# Patient Record
Sex: MALE | Race: WHITE | Employment: OTHER | URBAN - METROPOLITAN AREA
[De-identification: names, ages, dates, MRNs, and addresses within clinical notes are randomized per-mention and may not be internally consistent; named-entity substitution may affect disease eponyms.]

---

## 2018-11-13 ENCOUNTER — HOSPITAL ENCOUNTER (EMERGENCY)
Age: 83
Discharge: HOME | End: 2018-11-13
Attending: EMERGENCY MEDICINE
Payer: MEDICARE

## 2018-11-13 ENCOUNTER — APPOINTMENT (OUTPATIENT)
Dept: CT IMAGING | Age: 83
End: 2018-11-13
Attending: EMERGENCY MEDICINE
Payer: MEDICARE

## 2018-11-13 ENCOUNTER — APPOINTMENT (OUTPATIENT)
Dept: GENERAL RADIOLOGY | Age: 83
End: 2018-11-13
Attending: EMERGENCY MEDICINE
Payer: MEDICARE

## 2018-11-13 VITALS
OXYGEN SATURATION: 100 % | HEART RATE: 55 BPM | RESPIRATION RATE: 16 BRPM | DIASTOLIC BLOOD PRESSURE: 75 MMHG | HEIGHT: 66 IN | TEMPERATURE: 97.7 F | WEIGHT: 180 LBS | SYSTOLIC BLOOD PRESSURE: 148 MMHG | BODY MASS INDEX: 28.93 KG/M2

## 2018-11-13 DIAGNOSIS — R40.4 TRANSIENT ALTERATION OF AWARENESS: Primary | ICD-10-CM

## 2018-11-13 LAB
ALBUMIN SERPL-MCNC: 3.4 G/DL (ref 3.4–5)
ALBUMIN/GLOB SERPL: 0.9 {RATIO} (ref 0.8–1.7)
ALP SERPL-CCNC: 60 U/L (ref 45–117)
ALT SERPL-CCNC: 20 U/L (ref 16–61)
ANION GAP SERPL CALC-SCNC: 9 MMOL/L (ref 3–18)
APPEARANCE UR: CLEAR
AST SERPL-CCNC: 16 U/L (ref 15–37)
BASOPHILS # BLD: 0 K/UL (ref 0–0.1)
BASOPHILS NFR BLD: 0 % (ref 0–2)
BILIRUB SERPL-MCNC: 0.6 MG/DL (ref 0.2–1)
BILIRUB UR QL: NEGATIVE
BUN SERPL-MCNC: 21 MG/DL (ref 7–18)
BUN/CREAT SERPL: 21
CALCIUM SERPL-MCNC: 8.6 MG/DL (ref 8.5–10.1)
CHLORIDE SERPL-SCNC: 106 MMOL/L (ref 100–108)
CK MB CFR SERPL CALC: 2.4 % (ref 0–4)
CK MB SERPL-MCNC: 1.7 NG/ML (ref 5–25)
CK SERPL-CCNC: 70 U/L (ref 39–308)
CO2 SERPL-SCNC: 24 MMOL/L (ref 21–32)
COLOR UR: YELLOW
CREAT SERPL-MCNC: 1 MG/DL (ref 0.6–1.3)
DIFFERENTIAL METHOD BLD: ABNORMAL
EOSINOPHIL # BLD: 0.2 K/UL (ref 0–0.4)
EOSINOPHIL NFR BLD: 2 % (ref 0–5)
ERYTHROCYTE [DISTWIDTH] IN BLOOD BY AUTOMATED COUNT: 13.7 % (ref 11.6–14.5)
GLOBULIN SER CALC-MCNC: 3.7 G/DL (ref 2–4)
GLUCOSE SERPL-MCNC: 141 MG/DL (ref 74–99)
GLUCOSE UR STRIP.AUTO-MCNC: NEGATIVE MG/DL
HCT VFR BLD AUTO: 40.2 % (ref 36–48)
HGB BLD-MCNC: 13.5 G/DL (ref 13–16)
HGB UR QL STRIP: NEGATIVE
KETONES UR QL STRIP.AUTO: NEGATIVE MG/DL
LEUKOCYTE ESTERASE UR QL STRIP.AUTO: NEGATIVE
LYMPHOCYTES # BLD: 1.1 K/UL (ref 0.9–3.6)
LYMPHOCYTES NFR BLD: 10 % (ref 21–52)
MCH RBC QN AUTO: 30.3 PG (ref 24–34)
MCHC RBC AUTO-ENTMCNC: 33.6 G/DL (ref 31–37)
MCV RBC AUTO: 90.3 FL (ref 74–97)
MONOCYTES # BLD: 0.9 K/UL (ref 0.05–1.2)
MONOCYTES NFR BLD: 9 % (ref 3–10)
NEUTS SEG # BLD: 8.3 K/UL (ref 1.8–8)
NEUTS SEG NFR BLD: 79 % (ref 40–73)
NITRITE UR QL STRIP.AUTO: NEGATIVE
PH UR STRIP: 6 [PH] (ref 5–8)
PLATELET # BLD AUTO: 190 K/UL (ref 135–420)
PMV BLD AUTO: 10.4 FL (ref 9.2–11.8)
POTASSIUM SERPL-SCNC: 4.2 MMOL/L (ref 3.5–5.5)
PROT SERPL-MCNC: 7.1 G/DL (ref 6.4–8.2)
PROT UR STRIP-MCNC: NEGATIVE MG/DL
RBC # BLD AUTO: 4.45 M/UL (ref 4.7–5.5)
SODIUM SERPL-SCNC: 139 MMOL/L (ref 136–145)
SP GR UR REFRACTOMETRY: 1.02 (ref 1–1.03)
TROPONIN I SERPL-MCNC: <0.02 NG/ML (ref 0–0.04)
UROBILINOGEN UR QL STRIP.AUTO: 0.2 EU/DL (ref 0.2–1)
WBC # BLD AUTO: 10.5 K/UL (ref 4.6–13.2)

## 2018-11-13 PROCEDURE — 80053 COMPREHEN METABOLIC PANEL: CPT

## 2018-11-13 PROCEDURE — 99285 EMERGENCY DEPT VISIT HI MDM: CPT

## 2018-11-13 PROCEDURE — 71045 X-RAY EXAM CHEST 1 VIEW: CPT

## 2018-11-13 PROCEDURE — 85025 COMPLETE CBC W/AUTO DIFF WBC: CPT

## 2018-11-13 PROCEDURE — 81003 URINALYSIS AUTO W/O SCOPE: CPT

## 2018-11-13 PROCEDURE — 93005 ELECTROCARDIOGRAM TRACING: CPT

## 2018-11-13 PROCEDURE — 70450 CT HEAD/BRAIN W/O DYE: CPT

## 2018-11-13 PROCEDURE — 82553 CREATINE MB FRACTION: CPT

## 2018-11-13 NOTE — ED NOTES
Pt hourly rounding competed. Safety Pt (*) resting on stretcher with side rails up and call bell in reach. () in chair 
  () in parents arms. Toileting Pt offered ()Bedpan 
   ()Assistance to Restroom 
   (*)Urinal 
Ongoing UpdatesUpdated on plan of care and status of test results. Pain Management Inquired as to comfort and offered comfort measures: (*) warm blankets 
 (*) dimmed lights

## 2018-11-13 NOTE — ED PROVIDER NOTES
EMERGENCY DEPARTMENT HISTORY AND PHYSICAL EXAM 
 
Date: 11/13/2018 Patient Name: Wendy Lin. History of Presenting Illness Chief Complaint Patient presents with  Altered mental status History Provided By: Patient and Patient's Wife Chief Complaint: AMS Duration: PTA Timing:  Acute Location: mental 
Quality: unresponsive Associated Symptoms: denies any other associated signs or symptoms Additional History (Context):  
3:05 PM  
Wendy Gee is a 80 y.o. male with PMHX of dementia who presents to the emergency department C/O 10 minute episode of AMS while he was sitting at the kitchen room table PTA. The patient started to slump over, and his wife help him up from behind. Wife states she was asking him questions, but he was not responding. He slowly returned to his baseline after approximately 10 minutes. Per wife, the patient has returned to baseline. The patient does not recall this event and is unsure if he lost consciousness. Pt denies fever, chills, cough, chest pain, shortness of breath, vomiting, diarrhea, constipation. , and any other sxs or complaints. PCP: Sharmin Starks MD 
 
Current Outpatient Medications Medication Sig Dispense Refill  travoprost (TRAVATAN Z) 0.004 % ophthalmic solution Administer 1 Drop to both eyes every evening.  digoxin (LANOXIN) 0.25 mg tablet TAKE ONE TABLET BY MOUTH EVERY DAY 90 Tab 3  
 latanoprost (XALATAN) 0.005 % ophthalmic solution Administer 1 Drop to both eyes nightly.  CEFDINIR PO Take 300 mg by mouth daily. For 10 days  SILODOSIN (RAPAFLO PO) Take  by mouth.  DONEPEZIL HCL (ARICEPT PO) Take  by mouth.  azithromycin (ZITHROMAX Z-MOE) 250 mg tablet Take 2 tabs by mouth on first day. Then take one tablet daily for 4 days. 1 Package 0  
 SILDENAFIL CITRATE (VIAGRA PO) Take  by mouth.  CALCIUM-VITAMIN D3 PO Take  by mouth.  Cholecalciferol, Vitamin D3, (VITAMIN D-3) 1,000 unit Chew Take  by mouth.  aspirin 81 mg tablet Take  by mouth.  CLONAZEPAM PO Take 1 mg by mouth nightly.  MULTIVITAMINS W-MINERALS/LUT (CENTRUM SILVER PO) Take  by mouth.  BIMATOPROST (LUMIGAN OP) Apply  to eye.  TIMOLOL OP Apply  to eye. Past History Past Medical History: 
Past Medical History:  
Diagnosis Date  Dementia  GERD (gastroesophageal reflux disease)  Glaucoma  Near syncope  Paroxysmal supraventricular tachycardia ([address] 09/28/10  
 asymptomatic  Syncope and collapse 12/17/2013 Past Surgical History: 
Past Surgical History:  
Procedure Laterality Date  HX TONSILLECTOMY Family History: 
History reviewed. No pertinent family history. Social History: 
Social History Tobacco Use  Smoking status: Former Smoker  Smokeless tobacco: Never Used Substance Use Topics  Alcohol use: No  
 Drug use: No  
 
 
Allergies: 
No Known Allergies Review of Systems Review of Systems Unable to perform ROS: Dementia Constitutional: Negative for chills and fever. Respiratory: Negative for cough. Cardiovascular: Negative for chest pain. Gastrointestinal: Negative for constipation, diarrhea and vomiting. Neurological: Altered mental status Physical Exam  
 
Vitals:  
 11/13/18 1700 11/13/18 1710 11/13/18 1720 11/13/18 1744 BP: 153/78   148/75 Pulse:  (!) 56  (!) 55 Resp:  19  16 Temp:      
SpO2:   96% 100% Weight:      
Height:      
 
Physical Exam  
Nursing note and vitals reviewed. Constitutional: Elderly appearing Head: Normocephalic, Atraumatic Eyes: Pupils are equal, round, and reactive to light, EOMI Neck: Supple, non-tender Cardiovascular: Bradycardic, no murmurs, rubs, or gallops Chest: Normal work of breathing and chest excursion bilaterally Lungs: Clear to ausculation bilaterally Abdomen: Soft, non tender, non distended, normoactive bowel sounds Back: No evidence of trauma or deformity Extremities: No evidence of trauma or deformity, no LE edema Skin: Warm and dry, normal cap refill Neuro: Alert and oriented to person and place, but confused on date and events, CN intact, normal speech, non-focal and at baseline per wife. Psychiatric: Normal mood and affect Diagnostic Study Results Labs - Recent Results (from the past 12 hour(s)) EKG, 12 LEAD, INITIAL Collection Time: 11/13/18  3:05 PM  
Result Value Ref Range Ventricular Rate 56 BPM  
 Atrial Rate 56 BPM  
 P-R Interval 188 ms QRS Duration 110 ms  
 Q-T Interval 440 ms QTC Calculation (Bezet) 424 ms Calculated P Axis 70 degrees Calculated R Axis 12 degrees Calculated T Axis 14 degrees Diagnosis Sinus bradycardia Otherwise normal ECG When compared with ECG of 09-DEC-2013 12:55, 
premature ventricular complexes are no longer present CBC WITH AUTOMATED DIFF Collection Time: 11/13/18  3:15 PM  
Result Value Ref Range WBC 10.5 4.6 - 13.2 K/uL  
 RBC 4.45 (L) 4.70 - 5.50 M/uL  
 HGB 13.5 13.0 - 16.0 g/dL HCT 40.2 36.0 - 48.0 % MCV 90.3 74.0 - 97.0 FL  
 MCH 30.3 24.0 - 34.0 PG  
 MCHC 33.6 31.0 - 37.0 g/dL  
 RDW 13.7 11.6 - 14.5 % PLATELET 361 929 - 788 K/uL MPV 10.4 9.2 - 11.8 FL  
 NEUTROPHILS 79 (H) 40 - 73 % LYMPHOCYTES 10 (L) 21 - 52 % MONOCYTES 9 3 - 10 % EOSINOPHILS 2 0 - 5 % BASOPHILS 0 0 - 2 %  
 ABS. NEUTROPHILS 8.3 (H) 1.8 - 8.0 K/UL  
 ABS. LYMPHOCYTES 1.1 0.9 - 3.6 K/UL  
 ABS. MONOCYTES 0.9 0.05 - 1.2 K/UL  
 ABS. EOSINOPHILS 0.2 0.0 - 0.4 K/UL  
 ABS. BASOPHILS 0.0 0.0 - 0.1 K/UL  
 DF AUTOMATED METABOLIC PANEL, COMPREHENSIVE Collection Time: 11/13/18  3:15 PM  
Result Value Ref Range Sodium 139 136 - 145 mmol/L Potassium 4.2 3.5 - 5.5 mmol/L Chloride 106 100 - 108 mmol/L  
 CO2 24 21 - 32 mmol/L  Anion gap 9 3.0 - 18 mmol/L  
 Glucose 141 (H) 74 - 99 mg/dL BUN 21 (H) 7.0 - 18 MG/DL Creatinine 1.00 0.6 - 1.3 MG/DL  
 BUN/Creatinine ratio 21 GFR est AA >60 >60 ml/min/1.73m2 GFR est non-AA >60 >60 ml/min/1.73m2 Calcium 8.6 8.5 - 10.1 MG/DL Bilirubin, total 0.6 0.2 - 1.0 MG/DL  
 ALT (SGPT) 20 16 - 61 U/L  
 AST (SGOT) 16 15 - 37 U/L Alk. phosphatase 60 45 - 117 U/L Protein, total 7.1 6.4 - 8.2 g/dL Albumin 3.4 3.4 - 5.0 g/dL Globulin 3.7 2.0 - 4.0 g/dL A-G Ratio 0.9 0.8 - 1.7 CARDIAC PANEL,(CK, CKMB & TROPONIN) Collection Time: 11/13/18  3:15 PM  
Result Value Ref Range CK 70 39 - 308 U/L  
 CK - MB 1.7 <3.6 ng/ml CK-MB Index 2.4 0.0 - 4.0 % Troponin-I, Qt. <0.02 0.0 - 0.045 NG/ML  
URINALYSIS W/ RFLX MICROSCOPIC Collection Time: 11/13/18  5:28 PM  
Result Value Ref Range Color YELLOW Appearance CLEAR Specific gravity 1.021 1.005 - 1.030    
 pH (UA) 6.0 5.0 - 8.0 Protein NEGATIVE  NEG mg/dL Glucose NEGATIVE  NEG mg/dL Ketone NEGATIVE  NEG mg/dL Bilirubin NEGATIVE  NEG Blood NEGATIVE  NEG Urobilinogen 0.2 0.2 - 1.0 EU/dL Nitrites NEGATIVE  NEG Leukocyte Esterase NEGATIVE  NEG Radiologic Studies - 
CT Results  (Last 48 hours)  
          
 11/13/18 1649  CT HEAD WO CONT Final result Impression:  IMPRESSION:  
   
No acute intracranial abnormalities. Narrative:  EXAM: CT head INDICATION: Decreased alertness syncope COMPARISON: September 9, 2013 TECHNIQUE: Axial CT imaging of the head was performed without intravenous  
contrast. One or more dose reduction techniques were used on this CT: automated  
exposure control, adjustment of the mAs and/or kVp according to patient size,  
and iterative reconstruction techniques. The specific techniques used on this CT exam have been documented in the patient's electronic medical record.  
   
_______________ FINDINGS:  
   
 BRAIN AND POSTERIOR FOSSA: There is age-appropriate atrophy. There is no  
intracranial hemorrhage, mass effect, or midline shift. There is moderate small  
vessel ischemic disease. EXTRA-AXIAL SPACES AND MENINGES: There are no abnormal extra-axial fluid  
collections. CALVARIUM: Intact. SINUSES: Clear. OTHER: None.  
   
_______________ CXR Results  (Last 48 hours)  
          
 11/13/18 1522  XR CHEST PORT Final result Impression:  Impression: No acute cardiopulmonary disease. Narrative:  CHEST AP PORTABLE Indication: Altered mental status. Comparison: 12/09/2013. Findings: The lungs appear clear. The cardiac silhouette and pulmonary  
vascularity appear within normal limits. No evidence for pneumothorax or pleural  
effusion. Medications given in the ED- Medications - No data to display Medical Decision Making I am the first provider for this patient. I reviewed the vital signs, available nursing notes, past medical history, past surgical history, family history and social history. Vital Signs-Reviewed the patient's vital signs. Pulse Oximetry Analysis - 98% on room air Cardiac Monitor: 
Rate: 56 bpm 
Rhythm: Sinus bradycardia EKG interpretation: (Preliminary) 3:05 PM  
Sinus bradycardia. Rate 56 bpm. QRS duration 110 ms. EKG read by Cyndi Brand MD at 3:10 PM  
 
Records Reviewed: Nursing Notes and Old Medical Records Provider Notes (Medical Decision Making): 80year old male brought to ED by wife for transient altered mental status. He has remained at baseline throughout his ED stay without any complaints and no acute abnormalities on labs or imaging. Will discharge with early PCP follow up and strict return precautions. Patient, wife, and daughter understand and agree with this plan. Procedures: 
Procedures ED Course: 3:05 PM Initial assessment performed. The patients presenting problems have been discussed, and they are in agreement with the care plan formulated and outlined with them. I have encouraged them to ask questions as they arise throughout their visit. Diagnosis and Disposition DISCHARGE NOTE: 
5:55 PM  
Arianne Sylvester Jr.'s  results have been reviewed with him. He has been counseled regarding his diagnosis, treatment, and plan. He verbally conveys understanding and agreement of the signs, symptoms, diagnosis, treatment and prognosis and additionally agrees to follow up as discussed. He also agrees with the care-plan and conveys that all of his questions have been answered. I have also provided discharge instructions for him that include: educational information regarding their diagnosis and treatment, and list of reasons why they would want to return to the ED prior to their follow-up appointment, should his condition change. He has been provided with education for proper emergency department utilization. CLINICAL IMPRESSION: 
 
1. Transient alteration of awareness PLAN: 
1. D/C Home 2. Current Discharge Medication List  
  
 
3. Follow-up Information Follow up With Specialties Details Why Contact Info Saleem Manzo MD Internal Medicine Schedule an appointment as soon as possible for a visit in 2 days For primary care follow up [address] [facility] [address] [place] [address] 
[phone] [facility] EMERGENCY DEPT Emergency Medicine  As needed, If symptoms worsen [address] [facility] [address] 
[phone]  
  
 
_______________________________ Attestations: This note is prepared by Dutch Meredith, acting as Scribe for Marion Neri MD. Marion Neri MD:  The scribe's documentation has been prepared under my direction and personally reviewed by me in its entirety.   I confirm that the note above accurately reflects all work, treatment, procedures, and medical decision making performed by me. 
_______________________________

## 2018-11-13 NOTE — ED TRIAGE NOTES
Patient had syncopal episode at home, EMS reports pul;se in 45s at arrival and diaporeti, hx dementia, alert and orientated to name and birthday, patient speaking in completing sentences and able to make needs known.

## 2018-11-13 NOTE — DISCHARGE INSTRUCTIONS
Altered Mental Status: Care Instructions  Your Care Instructions    Altered mental status is a change in how well your brain is working. As a result, you may be confused, be less alert than usual, or act in odd ways. This may include seeing or hearing things that aren't really there (hallucinations). A mental status change has many possible causes. For example, it may be the result of an infection, an imbalance of chemicals in the body, or a chronic disease such as diabetes or COPD. It can also be caused by things such as a head injury, taking certain medicines, or using alcohol or drugs. The doctor may do tests to look for the cause. These tests may include urine tests, blood tests, and imaging tests such as a CT scan. Sometimes a clear cause isn't found. But tests can help the doctor rule out a serious cause of your symptoms. A change in mental status can be scary. But mental status will often return to normal when the cause is treated. So it is important to get any follow-up testing or treatment the doctor has suggested. The doctor has checked you carefully, but problems can develop later. If you notice any problems or new symptoms, get medical treatment right away. Follow-up care is a key part of your treatment and safety. Be sure to make and go to all appointments, and call your doctor if you are having problems. It's also a good idea to know your test results and keep a list of the medicines you take. How can you care for yourself at home? · Be safe with medicines. Take your medicines exactly as prescribed. Call your doctor if you think you are having a problem with your medicine. · Have another adult stay with you until you are better. This can help keep you safe. Ask that person to watch for signs that your mental status is getting worse. When should you call for help? Call 911 anytime you think you may need emergency care. For example, call if:    · You passed out (lost consciousness).  Call your doctor now or seek immediate medical care if:    · Your mental status is getting worse.     · You have new symptoms, such as a fever, chills, or shortness of breath.     · You do not feel safe.    Watch closely for changes in your health, and be sure to contact your doctor if:    · You do not get better as expected. Where can you learn more? Go to http://becky-jarrod.info/. Enter P408 in the search box to learn more about \"Altered Mental Status: Care Instructions. \"  Current as of: June 4, 2018  Content Version: 11.8  © 2545-6479 [employer]. Care instructions adapted under license by [employer] (which disclaims liability or warranty for this information). If you have questions about a medical condition or this instruction, always ask your healthcare professional. [address] any warranty or liability for your use of this information.

## 2019-01-29 LAB
ATRIAL RATE: 56 BPM
CALCULATED P AXIS, ECG09: 70 DEGREES
CALCULATED R AXIS, ECG10: 12 DEGREES
CALCULATED T AXIS, ECG11: 14 DEGREES
DIAGNOSIS, 93000: NORMAL
P-R INTERVAL, ECG05: 188 MS
Q-T INTERVAL, ECG07: 440 MS
QRS DURATION, ECG06: 110 MS
QTC CALCULATION (BEZET), ECG08: 424 MS
VENTRICULAR RATE, ECG03: 56 BPM

## 2022-09-25 ENCOUNTER — HOSPITAL ENCOUNTER (EMERGENCY)
Age: 87
Discharge: HOME | End: 2022-09-25
Attending: STUDENT IN AN ORGANIZED HEALTH CARE EDUCATION/TRAINING PROGRAM
Payer: MEDICARE

## 2022-09-25 ENCOUNTER — APPOINTMENT (OUTPATIENT)
Dept: GENERAL RADIOLOGY | Age: 87
End: 2022-09-25
Attending: STUDENT IN AN ORGANIZED HEALTH CARE EDUCATION/TRAINING PROGRAM
Payer: MEDICARE

## 2022-09-25 VITALS
SYSTOLIC BLOOD PRESSURE: 166 MMHG | TEMPERATURE: 98.1 F | DIASTOLIC BLOOD PRESSURE: 98 MMHG | OXYGEN SATURATION: 100 % | HEIGHT: 68 IN | HEART RATE: 97 BPM | WEIGHT: 203.9 LBS | RESPIRATION RATE: 16 BRPM | BODY MASS INDEX: 30.9 KG/M2

## 2022-09-25 DIAGNOSIS — R55 SYNCOPE AND COLLAPSE: Primary | ICD-10-CM

## 2022-09-25 LAB
ALBUMIN SERPL-MCNC: 3.1 G/DL (ref 3.4–5)
ALBUMIN/GLOB SERPL: 0.9 {RATIO} (ref 0.8–1.7)
ALP SERPL-CCNC: 55 U/L (ref 45–117)
ALT SERPL-CCNC: 17 U/L (ref 16–61)
ANION GAP SERPL CALC-SCNC: 5 MMOL/L (ref 3–18)
APPEARANCE UR: CLEAR
AST SERPL-CCNC: 13 U/L (ref 10–38)
BACTERIA URNS QL MICRO: ABNORMAL /HPF
BASOPHILS # BLD: 0 K/UL (ref 0–0.1)
BASOPHILS NFR BLD: 0 % (ref 0–2)
BILIRUB SERPL-MCNC: 0.4 MG/DL (ref 0.2–1)
BILIRUB UR QL: NEGATIVE
BUN SERPL-MCNC: 17 MG/DL (ref 7–18)
BUN/CREAT SERPL: 17 (ref 12–20)
CALCIUM SERPL-MCNC: 8.5 MG/DL (ref 8.5–10.1)
CHLORIDE SERPL-SCNC: 108 MMOL/L (ref 100–111)
CK SERPL-CCNC: 33 U/L (ref 39–308)
CO2 SERPL-SCNC: 26 MMOL/L (ref 21–32)
COLOR UR: YELLOW
CREAT SERPL-MCNC: 1.03 MG/DL (ref 0.6–1.3)
DIFFERENTIAL METHOD BLD: ABNORMAL
EOSINOPHIL # BLD: 0.3 K/UL (ref 0–0.4)
EOSINOPHIL NFR BLD: 3 % (ref 0–5)
EPITH CASTS URNS QL MICRO: 0 /LPF (ref 0–5)
ERYTHROCYTE [DISTWIDTH] IN BLOOD BY AUTOMATED COUNT: 13.2 % (ref 11.6–14.5)
GLOBULIN SER CALC-MCNC: 3.4 G/DL (ref 2–4)
GLUCOSE BLD STRIP.AUTO-MCNC: 157 MG/DL (ref 70–110)
GLUCOSE SERPL-MCNC: 177 MG/DL (ref 74–99)
GLUCOSE UR STRIP.AUTO-MCNC: NEGATIVE MG/DL
HCT VFR BLD AUTO: 39.5 % (ref 36–48)
HGB BLD-MCNC: 13 G/DL (ref 13–16)
HGB UR QL STRIP: ABNORMAL
HYALINE CASTS URNS QL MICRO: ABNORMAL /LPF (ref 0–2)
IMM GRANULOCYTES # BLD AUTO: 0.1 K/UL (ref 0–0.04)
IMM GRANULOCYTES NFR BLD AUTO: 1 % (ref 0–0.5)
KETONES UR QL STRIP.AUTO: NEGATIVE MG/DL
LEUKOCYTE ESTERASE UR QL STRIP.AUTO: NEGATIVE
LYMPHOCYTES # BLD: 1.1 K/UL (ref 0.9–3.6)
LYMPHOCYTES NFR BLD: 11 % (ref 21–52)
MCH RBC QN AUTO: 29.9 PG (ref 24–34)
MCHC RBC AUTO-ENTMCNC: 32.9 G/DL (ref 31–37)
MCV RBC AUTO: 90.8 FL (ref 78–100)
MONOCYTES # BLD: 0.7 K/UL (ref 0.05–1.2)
MONOCYTES NFR BLD: 7 % (ref 3–10)
NEUTS SEG # BLD: 7.6 K/UL (ref 1.8–8)
NEUTS SEG NFR BLD: 78 % (ref 40–73)
NITRITE UR QL STRIP.AUTO: NEGATIVE
NRBC # BLD: 0 K/UL (ref 0–0.01)
NRBC BLD-RTO: 0 PER 100 WBC
PH UR STRIP: 6.5 [PH] (ref 5–8)
PLATELET # BLD AUTO: 190 K/UL (ref 135–420)
PMV BLD AUTO: 10.4 FL (ref 9.2–11.8)
POTASSIUM SERPL-SCNC: 3.9 MMOL/L (ref 3.5–5.5)
PROT SERPL-MCNC: 6.5 G/DL (ref 6.4–8.2)
PROT UR STRIP-MCNC: NEGATIVE MG/DL
RBC # BLD AUTO: 4.35 M/UL (ref 4.35–5.65)
RBC #/AREA URNS HPF: ABNORMAL /HPF (ref 0–5)
SODIUM SERPL-SCNC: 139 MMOL/L (ref 136–145)
SP GR UR REFRACTOMETRY: 1.02 (ref 1–1.03)
TROPONIN-HIGH SENSITIVITY: 5 NG/L (ref 0–78)
TROPONIN-HIGH SENSITIVITY: 5 NG/L (ref 0–78)
UROBILINOGEN UR QL STRIP.AUTO: 1 EU/DL (ref 0.2–1)
WBC # BLD AUTO: 9.7 K/UL (ref 4.6–13.2)
WBC URNS QL MICRO: ABNORMAL /HPF (ref 0–5)

## 2022-09-25 PROCEDURE — 82962 GLUCOSE BLOOD TEST: CPT

## 2022-09-25 PROCEDURE — 93005 ELECTROCARDIOGRAM TRACING: CPT

## 2022-09-25 PROCEDURE — 81001 URINALYSIS AUTO W/SCOPE: CPT

## 2022-09-25 PROCEDURE — 85025 COMPLETE CBC W/AUTO DIFF WBC: CPT

## 2022-09-25 PROCEDURE — 74011250636 HC RX REV CODE- 250/636: Performed by: STUDENT IN AN ORGANIZED HEALTH CARE EDUCATION/TRAINING PROGRAM

## 2022-09-25 PROCEDURE — 96361 HYDRATE IV INFUSION ADD-ON: CPT

## 2022-09-25 PROCEDURE — 71045 X-RAY EXAM CHEST 1 VIEW: CPT

## 2022-09-25 PROCEDURE — 99285 EMERGENCY DEPT VISIT HI MDM: CPT

## 2022-09-25 PROCEDURE — 80053 COMPREHEN METABOLIC PANEL: CPT

## 2022-09-25 PROCEDURE — 84484 ASSAY OF TROPONIN QUANT: CPT

## 2022-09-25 PROCEDURE — 96360 HYDRATION IV INFUSION INIT: CPT

## 2022-09-25 PROCEDURE — 82550 ASSAY OF CK (CPK): CPT

## 2022-09-25 RX ADMIN — SODIUM CHLORIDE 1000 ML: 9 INJECTION, SOLUTION INTRAVENOUS at 16:56

## 2022-09-25 NOTE — ED PROVIDER NOTES
EMERGENCY DEPARTMENT HISTORY AND PHYSICAL EXAM      Date: 9/25/2022  Patient Name: Israel Brewster. History of Presenting Illness     Chief Complaint   Patient presents with    Syncope       Location/Duration/Severity/Modifying factors   Arianne Carranza. is a 80 y.o. male with a PMH Dementia presents to the ER for syncope. As per patient's wife, he was sitting in a screen, states that he did not feel well and then passed out in his chair. Wife states that he was unconscious for approximately 15 minutes before coming to. Patient does not remember anything. Denies any shaking movements with the episode. Denies period of confusion after the incident. No incontinence. No other medical complaints. Per EMS, patient was briefly hypotensive and responded to IV fluid hydration. Fingerstick glucose within normal limits. Syncope   Pertinent negatives include no chest pain, no fever, no abdominal pain, no nausea, no vomiting, no congestion, no headaches, no dizziness, no light-headedness and no weakness. His past medical history is significant for syncope. There are no other complaints, changes, or physical findings at this time. PCP: Other, None, MD    Current Outpatient Medications   Medication Sig Dispense Refill    travoprost (TRAVATAN Z) 0.004 % ophthalmic solution Administer 1 Drop to both eyes every evening. digoxin (LANOXIN) 0.25 mg tablet TAKE ONE TABLET BY MOUTH EVERY DAY 90 Tab 3    latanoprost (XALATAN) 0.005 % ophthalmic solution Administer 1 Drop to both eyes nightly. CEFDINIR PO Take 300 mg by mouth daily. For 10 days      SILODOSIN (RAPAFLO PO) Take  by mouth. DONEPEZIL HCL (ARICEPT PO) Take  by mouth. azithromycin (ZITHROMAX Z-MOE) 250 mg tablet Take 2 tabs by mouth on first day. Then take one tablet daily for 4 days. 1 Package 0    SILDENAFIL CITRATE (VIAGRA PO) Take  by mouth. CALCIUM-VITAMIN D3 PO Take  by mouth.       Cholecalciferol, Vitamin D3, (VITAMIN D-3) 1,000 unit Chew Take  by mouth. aspirin 81 mg tablet Take  by mouth. CLONAZEPAM PO Take 1 mg by mouth nightly. MULTIVITAMINS W-MINERALS/LUT (CENTRUM SILVER PO) Take  by mouth. BIMATOPROST (LUMIGAN OP) Apply  to eye. TIMOLOL OP Apply  to eye. Past History     Past Medical History:  Past Medical History:   Diagnosis Date    Dementia     GERD (gastroesophageal reflux disease)     Glaucoma     Near syncope     Paroxysmal supraventricular tachycardia ([facility] [address] 09/28/10    asymptomatic    Syncope and collapse 12/17/2013       Past Surgical History:  Past Surgical History:   Procedure Laterality Date    HX TONSILLECTOMY         Family History:  No family history on file. Social History:  Social History     Tobacco Use    Smoking status: Former    Smokeless tobacco: Never   Substance Use Topics    Alcohol use: No    Drug use: No       Allergies:  No Known Allergies      Review of Systems     Review of Systems   Constitutional:  Negative for chills and fever. HENT:  Negative for congestion and rhinorrhea. Eyes:  Negative for visual disturbance. Respiratory:  Negative for shortness of breath. Cardiovascular:  Positive for syncope. Negative for chest pain. Gastrointestinal:  Negative for abdominal pain, diarrhea, nausea and vomiting. Genitourinary:  Negative for dysuria. Neurological:  Positive for syncope. Negative for dizziness, weakness, light-headedness, numbness and headaches. All other systems reviewed and are negative. Physical Exam     Physical Exam  Vitals reviewed. Constitutional:       General: He is not in acute distress. Appearance: Normal appearance. He is not ill-appearing or toxic-appearing. HENT:      Head: Normocephalic and atraumatic. Right Ear: External ear normal.      Left Ear: External ear normal.      Nose: Nose normal.      Mouth/Throat:      Mouth: Mucous membranes are dry.    Eyes:      Extraocular Movements: Extraocular movements intact. Conjunctiva/sclera: Conjunctivae normal.      Pupils: Pupils are equal, round, and reactive to light. Cardiovascular:      Rate and Rhythm: Regular rhythm. Bradycardia present. Pulses: Normal pulses. Heart sounds: Normal heart sounds. Pulmonary:      Effort: Pulmonary effort is normal.      Breath sounds: Normal breath sounds. Abdominal:      General: Abdomen is flat. Palpations: Abdomen is soft. Tenderness: There is no abdominal tenderness. There is no guarding. Musculoskeletal:         General: No deformity or signs of injury. Normal range of motion. Cervical back: Neck supple. Skin:     General: Skin is warm and dry. Capillary Refill: Capillary refill takes less than 2 seconds. Neurological:      General: No focal deficit present. Mental Status: He is alert and oriented to person, place, and time. Mental status is at baseline. Cranial Nerves: No cranial nerve deficit. Sensory: No sensory deficit. Motor: No weakness. Psychiatric:         Mood and Affect: Mood normal.       Lab and Diagnostic Study Results     Diagnostic Study Results     Labs -     Recent Results (from the past 12 hour(s))   METABOLIC PANEL, COMPREHENSIVE    Collection Time: 09/25/22  4:30 PM   Result Value Ref Range    Sodium 139 136 - 145 mmol/L    Potassium 3.9 3.5 - 5.5 mmol/L    Chloride 108 100 - 111 mmol/L    CO2 26 21 - 32 mmol/L    Anion gap 5 3.0 - 18 mmol/L    Glucose 177 (H) 74 - 99 mg/dL    BUN 17 7.0 - 18 MG/DL    Creatinine 1.03 0.6 - 1.3 MG/DL    BUN/Creatinine ratio 17 12 - 20      GFR est AA >60 >60 ml/min/1.73m2    GFR est non-AA >60 >60 ml/min/1.73m2    Calcium 8.5 8.5 - 10.1 MG/DL    Bilirubin, total 0.4 0.2 - 1.0 MG/DL    ALT (SGPT) 17 16 - 61 U/L    AST (SGOT) 13 10 - 38 U/L    Alk.  phosphatase 55 45 - 117 U/L    Protein, total 6.5 6.4 - 8.2 g/dL    Albumin 3.1 (L) 3.4 - 5.0 g/dL    Globulin 3.4 2.0 - 4.0 g/dL    A-G Ratio 0.9 0.8 - 1.7 TROPONIN-HIGH SENSITIVITY    Collection Time: 09/25/22  4:30 PM   Result Value Ref Range    Troponin-High Sensitivity 5 0 - 78 ng/L   URINALYSIS W/ RFLX MICROSCOPIC    Collection Time: 09/25/22  4:30 PM   Result Value Ref Range    Color YELLOW      Appearance CLEAR      Specific gravity 1.018 1.005 - 1.030      pH (UA) 6.5 5.0 - 8.0      Protein Negative NEG mg/dL    Glucose Negative NEG mg/dL    Ketone Negative NEG mg/dL    Bilirubin Negative NEG      Blood MODERATE (A) NEG      Urobilinogen 1.0 0.2 - 1.0 EU/dL    Nitrites Negative NEG      Leukocyte Esterase Negative NEG     CK    Collection Time: 09/25/22  4:30 PM   Result Value Ref Range    CK 33 (L) 39 - 308 U/L   URINE MICROSCOPIC ONLY    Collection Time: 09/25/22  4:30 PM   Result Value Ref Range    WBC 0 to 3 0 - 5 /hpf    RBC 11 to 20 0 - 5 /hpf    Epithelial cells 0 0 - 5 /lpf    Bacteria FEW (A) NEG /hpf    Hyaline cast 0 to 3 0 - 2 /lpf   CBC WITH AUTOMATED DIFF    Collection Time: 09/25/22  4:45 PM   Result Value Ref Range    WBC 9.7 4.6 - 13.2 K/uL    RBC 4.35 4.35 - 5.65 M/uL    HGB 13.0 13.0 - 16.0 g/dL    HCT 39.5 36.0 - 48.0 %    MCV 90.8 78.0 - 100.0 FL    MCH 29.9 24.0 - 34.0 PG    MCHC 32.9 31.0 - 37.0 g/dL    RDW 13.2 11.6 - 14.5 %    PLATELET 750 256 - 885 K/uL    MPV 10.4 9.2 - 11.8 FL    NRBC 0.0 0  WBC    ABSOLUTE NRBC 0.00 0.00 - 0.01 K/uL    NEUTROPHILS 78 (H) 40 - 73 %    LYMPHOCYTES 11 (L) 21 - 52 %    MONOCYTES 7 3 - 10 %    EOSINOPHILS 3 0 - 5 %    BASOPHILS 0 0 - 2 %    IMMATURE GRANULOCYTES 1 (H) 0.0 - 0.5 %    ABS. NEUTROPHILS 7.6 1.8 - 8.0 K/UL    ABS. LYMPHOCYTES 1.1 0.9 - 3.6 K/UL    ABS. MONOCYTES 0.7 0.05 - 1.2 K/UL    ABS. EOSINOPHILS 0.3 0.0 - 0.4 K/UL    ABS. BASOPHILS 0.0 0.0 - 0.1 K/UL    ABS. IMM.  GRANS. 0.1 (H) 0.00 - 0.04 K/UL    DF AUTOMATED     GLUCOSE, POC    Collection Time: 09/25/22  4:52 PM   Result Value Ref Range    Glucose (POC) 157 (H) 70 - 110 mg/dL   TROPONIN-HIGH SENSITIVITY    Collection Time: 09/25/22  6:15 PM   Result Value Ref Range    Troponin-High Sensitivity 5 0 - 78 ng/L       Radiologic Studies -  XR CHEST PORT   Final Result      No active cardiopulmonary disease. CT Results  (Last 48 hours)      None          CXR Results  (Last 48 hours)                 09/25/22 1636  XR CHEST PORT Final result    Impression:      No active cardiopulmonary disease. Narrative:  EXAM: XR CHEST PORT       CLINICAL INDICATION/HISTORY: syncope     > Additional: None. COMPARISON: November 13, 2018       TECHNIQUE: Portable chest       _______________       FINDINGS:       SUPPORT DEVICES: None. HEART AND MEDIASTINUM: Normal size and contour. Normal pulmonary vasculature. LUNGS AND PLEURAL SPACES: Linear atelectasis or scarring in the left lung base,   unchanged. The lungs are well expanded and clear. No focal consolidation,   effusion, or pneumothorax. BONY THORAX AND SOFT TISSUES: No acute osseous abnormality. _______________                   Medications given in the ED-  Medications   sodium chloride 0.9 % bolus infusion 1,000 mL (0 mL IntraVENous Stopped 9/25/22 2003)           Medical Decision Making and ED Course   - I am the first and primary provider for this patient AND AM THE PRIMARY PROVIDER OF RECORD. - I reviewed the vital signs, available nursing notes, past medical history, past surgical history, family history and social history. - Initial assessment performed. The patients presenting problems have been discussed, and the staff are in agreement with the care plan formulated and outlined with them. I have encouraged them to ask questions as they arise throughout their visit. Vital Signs-Reviewed the patient's vital signs.     Patient Vitals for the past 12 hrs:   Temp Pulse Resp BP SpO2   09/25/22 2003 -- 97 16 (!) 166/98 --   09/25/22 1920 -- (!) 101 24 (!) 149/94 --   09/25/22 1905 -- 98 21 (!) 143/68 --   09/25/22 1850 -- 94 18 -- --   09/25/22 1835 -- (!) 57 19 -- --   09/25/22 1828 -- (!) 57 13 -- --   09/25/22 1820 -- 61 19 -- --   09/25/22 1813 -- (!) 59 19 133/61 --   09/25/22 1805 -- (!) 56 18 -- --   09/25/22 1758 -- 65 23 131/71 --   09/25/22 1750 -- (!) 55 16 -- --   09/25/22 1743 -- 60 18 139/63 --   09/25/22 1735 -- (!) 56 16 -- --   09/25/22 1728 -- 62 19 (!) 130/56 --   09/25/22 1720 -- 61 15 -- --   09/25/22 1713 -- (!) 54 16 127/70 --   09/25/22 1711 -- -- -- -- 100 %   09/25/22 1711 -- (!) 58 14 -- --   09/25/22 1705 -- (!) 56 19 -- --   09/25/22 1658 -- (!) 54 18 -- --   09/25/22 1656 -- (!) 55 18 (!) 123/46 --   09/25/22 1650 -- (!) 55 21 -- --   09/25/22 1643 -- 61 12 -- --   09/25/22 1641 -- (!) 50 18 (!) 132/51 --   09/25/22 1635 -- (!) 56 21 115/86 --   09/25/22 1628 -- (!) 59 17 -- --   09/25/22 1626 -- 64 21 115/73 --   09/25/22 1617 98.1 °F (36.7 °C) (!) 56 18 (!) 109/91 100 %   09/25/22 1611 -- -- -- Robin Naylor 109/91 --     Records Reviewed: Nursing Notes, Old Medical Records, and Previous electrocardiograms        Provider Notes (Medical Decision Making):     MDM  Number of Diagnoses or Management Options  Syncope and collapse  Diagnosis management comments: Well-appearing male in no acute distress presents to the ER with a syncopal episode. Patient with no cardiologist, never had a cardiology work-up. chart review was performed, patient was seen at [facility] a few months ago for syncope and was discharged. was not followed up by his primary care provider after that incident. EKG with sinus rhythm and a right bundle branch block that was not present in prior EKG in 2018. Lab and imaging results reviewed and discussed with patient at bedside, no acute abnormalities appreciated. Patient's blood pressure responded to IV fluid hydration. Patient states that he feels well, no medical complaints.   Admission for syncope work-up, given patient's age and lack of prodrome prior to the episode, family  states that they would like the patient to go home with outpatient cardiology work-up if second set of troponin returns wnl. Risk and benefits at bedside, family aware, requesting discharge with outpatient cardiology work-up. Pt signed out to oncoming provider pending repeat labs. ED Course:   6:22 PM Initial assessment performed. The patients presenting problems have been discussed, and they are in agreement with the care plan formulated and outlined with them. I have encouraged them to ask questions as they arise throughout their visit. Disposition     Pt was signed out to oncoming provider pending repeat labs. Attestations:    Zion Colorado, DO    Please note that this dictation was completed with Flirq, the computer voice recognition software. Quite often unanticipated grammatical, syntax, homophones, and other interpretive errors are inadvertently transcribed by the computer software. Please disregard these errors. Please excuse any errors that have escaped final proofreading. Thank you.

## 2022-09-25 NOTE — DISCHARGE INSTRUCTIONS
Please increase oral hydration. Follow-up with cardiology as soon as possible for further work-up and management of your current complaint. Please return to the ER immediately for any new or worsening symptoms such as recurrent syncope, chest pain, difficulty breathing, altered mental status or any other concerning symptoms.

## 2022-09-25 NOTE — ED NOTES
Bedside and Verbal shift change report given to [address] [place] [address],[address] (oncoming nurse) by Cynthia Little RN  (offgoing nurse). Report included the following information SBAR, MAR and Recent Results.

## 2022-09-25 NOTE — ED TRIAGE NOTES
Pt arrive to ed via ems with c/o syncopal episode. Per report from ems, pt was at home on porch when an episode of syncope started. Last approximately 15 minutes (per report from family). Pt was in wheelchair at the time of syncopal episode. Pt was hypotensive on scene Bp- 93/56. 18g,- L -AC. 1 L NS was infusing at the time of arrival, FSBS- 203. Active DNR form with pt at this time. Pt in alter at this time and at baseline, per EMS.

## 2022-09-25 NOTE — ED NOTES
ROUNDING ASSESSMENT: ACTIVITY: Pt resting quietly on cart; Patent airway; SAFETY: Cart in low position, side rails x1 for safety, call light within reach.

## 2022-09-26 LAB
ATRIAL RATE: 56 BPM
CALCULATED P AXIS, ECG09: 4 DEGREES
CALCULATED R AXIS, ECG10: 36 DEGREES
CALCULATED T AXIS, ECG11: 51 DEGREES
DIAGNOSIS, 93000: NORMAL
P-R INTERVAL, ECG05: 194 MS
Q-T INTERVAL, ECG07: 446 MS
QRS DURATION, ECG06: 148 MS
QTC CALCULATION (BEZET), ECG08: 430 MS
VENTRICULAR RATE, ECG03: 56 BPM